# Patient Record
Sex: MALE | Race: WHITE | URBAN - METROPOLITAN AREA
[De-identification: names, ages, dates, MRNs, and addresses within clinical notes are randomized per-mention and may not be internally consistent; named-entity substitution may affect disease eponyms.]

---

## 2020-11-04 ENCOUNTER — OFFICE VISIT (OUTPATIENT)
Dept: URBAN - METROPOLITAN AREA CLINIC 72 | Facility: CLINIC | Age: 55
End: 2020-11-04
Payer: COMMERCIAL

## 2020-11-04 DIAGNOSIS — H52.4 PRESBYOPIA: ICD-10-CM

## 2020-11-04 DIAGNOSIS — H40.053 OCULAR HYPERTENSION, BILATERAL: Primary | ICD-10-CM

## 2020-11-04 PROCEDURE — 76514 ECHO EXAM OF EYE THICKNESS: CPT | Performed by: OPTOMETRIST

## 2020-11-04 PROCEDURE — 92134 CPTRZ OPH DX IMG PST SGM RTA: CPT | Performed by: OPTOMETRIST

## 2020-11-04 PROCEDURE — 99204 OFFICE O/P NEW MOD 45 MIN: CPT | Performed by: OPTOMETRIST

## 2020-11-04 PROCEDURE — 92133 CPTRZD OPH DX IMG PST SGM ON: CPT | Performed by: OPTOMETRIST

## 2020-11-04 ASSESSMENT — INTRAOCULAR PRESSURE
OS: 24
OS: 21
OD: 22
OD: 23

## 2020-11-04 ASSESSMENT — VISUAL ACUITY
OS: 20/20
OD: 20/20

## 2020-11-04 NOTE — IMPRESSION/PLAN
Impression: Ocular hypertension, bilateral: H40.053. Bilateral.
- IOP borderline ou - slightly thick CCT's OD > OS -
ONH healthy apperance ou - Plan: Discussed diagnosis, explained and understood. no glaucoma meds recommended at this time. advised patient to start [employer] vitamins due to hx of familial AMD/ mother. OCT ordered and reviewed results with patient. Will continue to monitor condition and symptoms.